# Patient Record
Sex: FEMALE | Race: OTHER | HISPANIC OR LATINO | Employment: FULL TIME | URBAN - METROPOLITAN AREA
[De-identification: names, ages, dates, MRNs, and addresses within clinical notes are randomized per-mention and may not be internally consistent; named-entity substitution may affect disease eponyms.]

---

## 2024-01-17 ENCOUNTER — APPOINTMENT (OUTPATIENT)
Dept: PRIMARY CARE | Facility: CLINIC | Age: 24
End: 2024-01-17
Payer: COMMERCIAL

## 2024-03-04 ENCOUNTER — APPOINTMENT (OUTPATIENT)
Dept: PRIMARY CARE | Facility: CLINIC | Age: 24
End: 2024-03-04
Payer: COMMERCIAL

## 2024-03-06 ENCOUNTER — OFFICE VISIT (OUTPATIENT)
Dept: PRIMARY CARE | Facility: CLINIC | Age: 24
End: 2024-03-06
Payer: COMMERCIAL

## 2024-03-06 VITALS
TEMPERATURE: 97.5 F | RESPIRATION RATE: 18 BRPM | DIASTOLIC BLOOD PRESSURE: 88 MMHG | HEART RATE: 84 BPM | HEIGHT: 62 IN | BODY MASS INDEX: 29.7 KG/M2 | WEIGHT: 161.4 LBS | SYSTOLIC BLOOD PRESSURE: 130 MMHG

## 2024-03-06 DIAGNOSIS — L73.9 FOLLICULITIS: ICD-10-CM

## 2024-03-06 DIAGNOSIS — Z01.419 VISIT FOR PELVIC EXAM: ICD-10-CM

## 2024-03-06 DIAGNOSIS — M54.2 NECK PAIN: ICD-10-CM

## 2024-03-06 DIAGNOSIS — F41.9 ANXIETY: ICD-10-CM

## 2024-03-06 DIAGNOSIS — L70.0 ACNE VULGARIS: ICD-10-CM

## 2024-03-06 DIAGNOSIS — M62.838 MUSCLE SPASMS OF NECK: ICD-10-CM

## 2024-03-06 DIAGNOSIS — F50.81 BINGE EATING DISORDER: ICD-10-CM

## 2024-03-06 DIAGNOSIS — F33.40 RECURRENT MAJOR DEPRESSIVE DISORDER, IN REMISSION (CMS-HCC): ICD-10-CM

## 2024-03-06 DIAGNOSIS — Z00.00 WELLNESS EXAMINATION: Primary | ICD-10-CM

## 2024-03-06 PROBLEM — Z97.5 IUD (INTRAUTERINE DEVICE) IN PLACE: Status: ACTIVE | Noted: 2022-01-03

## 2024-03-06 PROBLEM — Z86.16 HISTORY OF COVID-19: Status: RESOLVED | Noted: 2023-01-03 | Resolved: 2024-03-06

## 2024-03-06 PROCEDURE — 99385 PREV VISIT NEW AGE 18-39: CPT | Performed by: NURSE PRACTITIONER

## 2024-03-06 RX ORDER — AZELAIC ACID 0.15 G/G
1 GEL TOPICAL 2 TIMES DAILY
Qty: 180 G | Refills: 3 | Status: SHIPPED | OUTPATIENT
Start: 2024-03-06 | End: 2025-03-06

## 2024-03-06 RX ORDER — CLINDAMYCIN PHOSPHATE 10 UG/ML
LOTION TOPICAL 2 TIMES DAILY
COMMUNITY
Start: 2024-02-08 | End: 2024-03-06 | Stop reason: SDUPTHER

## 2024-03-06 RX ORDER — PROPRANOLOL HYDROCHLORIDE 10 MG/1
10 TABLET ORAL 2 TIMES DAILY
COMMUNITY
Start: 2023-09-13 | End: 2024-03-06 | Stop reason: SDUPTHER

## 2024-03-06 RX ORDER — PROPRANOLOL HYDROCHLORIDE 10 MG/1
10 TABLET ORAL 2 TIMES DAILY
Qty: 60 TABLET | Refills: 3 | Status: SHIPPED | OUTPATIENT
Start: 2024-03-06 | End: 2025-03-06

## 2024-03-06 RX ORDER — CYCLOBENZAPRINE HCL 5 MG
5 TABLET ORAL NIGHTLY PRN
Qty: 10 TABLET | Refills: 0 | Status: SHIPPED | OUTPATIENT
Start: 2024-03-06 | End: 2024-03-16

## 2024-03-06 RX ORDER — AZELAIC ACID 0.15 G/G
1 GEL TOPICAL 2 TIMES DAILY
COMMUNITY
Start: 2024-02-08 | End: 2024-03-06 | Stop reason: SDUPTHER

## 2024-03-06 RX ORDER — CLINDAMYCIN PHOSPHATE 10 UG/ML
LOTION TOPICAL 2 TIMES DAILY
Qty: 180 ML | Refills: 3 | Status: SHIPPED | OUTPATIENT
Start: 2024-03-06 | End: 2025-03-06

## 2024-03-06 RX ORDER — LAMOTRIGINE 25 MG/1
50 TABLET ORAL NIGHTLY
Qty: 180 TABLET | Refills: 3 | Status: SHIPPED | OUTPATIENT
Start: 2024-03-06 | End: 2025-03-06

## 2024-03-06 RX ORDER — LAMOTRIGINE 25 MG/1
2 TABLET ORAL NIGHTLY
COMMUNITY
Start: 2024-02-08 | End: 2024-03-06 | Stop reason: SDUPTHER

## 2024-03-06 RX ORDER — BUPROPION HYDROCHLORIDE 150 MG/1
150 TABLET ORAL
COMMUNITY
Start: 2024-02-08 | End: 2024-03-06 | Stop reason: SDUPTHER

## 2024-03-06 RX ORDER — BUPROPION HYDROCHLORIDE 150 MG/1
150 TABLET ORAL
Qty: 90 TABLET | Refills: 3 | Status: SHIPPED | OUTPATIENT
Start: 2024-03-06 | End: 2025-03-06

## 2024-03-06 ASSESSMENT — ENCOUNTER SYMPTOMS
DYSPHORIC MOOD: 0
BACK PAIN: 0
PALPITATIONS: 0
FATIGUE: 0
SHORTNESS OF BREATH: 0
NECK PAIN: 1
DIARRHEA: 0
COUGH: 0
CONSTIPATION: 0
ABDOMINAL PAIN: 0
NECK STIFFNESS: 0
FEVER: 0
NERVOUS/ANXIOUS: 1

## 2024-03-06 NOTE — PROGRESS NOTES
"Simone Fish is a 23 y.o. female who presents for Annual Exam.  Cele is concerned about her weight. She does pilates and weight training twice per week. She does aerobic exercise 5 days per week. States her appetite is very large and she will eat 2-3 servings at least half of the nights. Then feels guilty afterwards and sometimes feels sick with nausea afterwards. This has been ongoing since childhood. She had previously been on 1/2 phentermine tablet, which was helpful. She could not take full tablet d/t caused tachycardia. She does not want to go back on phentermine now.  Review of Systems   Constitutional:  Negative for fatigue and fever.   Respiratory:  Negative for cough and shortness of breath.    Cardiovascular:  Negative for chest pain and palpitations.   Gastrointestinal:  Negative for abdominal pain, constipation and diarrhea.   Musculoskeletal:  Positive for neck pain. Negative for back pain, gait problem and neck stiffness.   Psychiatric/Behavioral:  Negative for dysphoric mood. The patient is nervous/anxious.    Objective   Physical Exam  Vitals reviewed.   Constitutional:       Appearance: Normal appearance.   HENT:      Head: Normocephalic.   Eyes:      Conjunctiva/sclera: Conjunctivae normal.   Cardiovascular:      Rate and Rhythm: Normal rate and regular rhythm.      Pulses: Normal pulses.   Pulmonary:      Breath sounds: Normal breath sounds.   Abdominal:      General: Bowel sounds are normal.      Palpations: Abdomen is soft.   Musculoskeletal:      Cervical back: Neck supple.   Skin:     General: Skin is warm and dry.   Neurological:      General: No focal deficit present.      Mental Status: She is alert.   Psychiatric:         Mood and Affect: Mood normal.         Thought Content: Thought content normal.     /88   Pulse 84   Temp 36.4 °C (97.5 °F)   Resp 18   Ht 1.575 m (5' 2\")   Wt 73.2 kg (161 lb 6.4 oz)   BMI 29.52 kg/m²   Assessment/Plan   Problem " List Items Addressed This Visit       Neck pain    Relevant Orders    Referral to Physical Therapy     Other Visit Diagnoses       Wellness examination    -  Primary    Visit for pelvic exam        Relevant Orders    Referral to Gynecology    Muscle spasms of neck        Relevant Medications    cyclobenzaprine (Flexeril) 5 mg tablet    Binge eating disorder        Relevant Orders    Referral to Psychology    Anxiety        Relevant Medications    buPROPion XL (Wellbutrin XL) 150 mg 24 hr tablet    propranolol (Inderal) 10 mg tablet    Recurrent major depressive disorder, in remission (CMS/HCC)        Relevant Medications    buPROPion XL (Wellbutrin XL) 150 mg 24 hr tablet    lamoTRIgine (LaMICtal) 25 mg tablet    Acne vulgaris        Relevant Medications    clindamycin (Cleocin T) 1 % lotion    Folliculitis        Relevant Medications    azelaic acid (Finacea) 15 % gel        F/U in 6 months for binge eating disorder, neck muscle pain, anxiety.

## 2024-09-09 ENCOUNTER — APPOINTMENT (OUTPATIENT)
Dept: PRIMARY CARE | Facility: CLINIC | Age: 24
End: 2024-09-09
Payer: COMMERCIAL

## 2024-09-12 ENCOUNTER — APPOINTMENT (OUTPATIENT)
Dept: PRIMARY CARE | Facility: CLINIC | Age: 24
End: 2024-09-12
Payer: COMMERCIAL

## 2024-09-12 VITALS
RESPIRATION RATE: 20 BRPM | TEMPERATURE: 97.3 F | DIASTOLIC BLOOD PRESSURE: 72 MMHG | HEIGHT: 62 IN | HEART RATE: 64 BPM | WEIGHT: 140 LBS | BODY MASS INDEX: 25.76 KG/M2 | SYSTOLIC BLOOD PRESSURE: 100 MMHG

## 2024-09-12 DIAGNOSIS — L73.9 FOLLICULITIS: ICD-10-CM

## 2024-09-12 DIAGNOSIS — F33.40 RECURRENT MAJOR DEPRESSIVE DISORDER, IN REMISSION (CMS-HCC): Primary | ICD-10-CM

## 2024-09-12 DIAGNOSIS — M54.2 NECK PAIN: ICD-10-CM

## 2024-09-12 DIAGNOSIS — F50.81 BINGE EATING DISORDER: ICD-10-CM

## 2024-09-12 DIAGNOSIS — F41.9 ANXIETY: ICD-10-CM

## 2024-09-12 PROBLEM — F50.819 BINGE EATING DISORDER: Status: ACTIVE | Noted: 2024-09-12

## 2024-09-12 LAB
AMPHETAMINES UR QL SCN: ABNORMAL
BARBITURATES UR QL SCN: ABNORMAL
BENZODIAZ UR QL SCN: ABNORMAL
BZE UR QL SCN: ABNORMAL
CANNABINOIDS UR QL SCN: ABNORMAL
FENTANYL+NORFENTANYL UR QL SCN: ABNORMAL
METHADONE UR QL SCN: ABNORMAL
OPIATES UR QL SCN: ABNORMAL
OXYCODONE+OXYMORPHONE UR QL SCN: ABNORMAL
PCP UR QL SCN: ABNORMAL

## 2024-09-12 PROCEDURE — 99214 OFFICE O/P EST MOD 30 MIN: CPT | Performed by: NURSE PRACTITIONER

## 2024-09-12 RX ORDER — LISDEXAMFETAMINE DIMESYLATE 10 MG/1
10 TABLET, CHEWABLE ORAL DAILY
Qty: 30 TABLET | Refills: 0 | Status: SHIPPED | OUTPATIENT
Start: 2024-09-24 | End: 2024-12-23

## 2024-09-12 RX ORDER — BUPROPION HYDROCHLORIDE 150 MG/1
300 TABLET ORAL
Qty: 180 TABLET | Refills: 3 | Status: SHIPPED | OUTPATIENT
Start: 2024-09-12 | End: 2024-09-12

## 2024-09-12 RX ORDER — BUPROPION HYDROCHLORIDE 300 MG/1
300 TABLET ORAL
Qty: 90 TABLET | Refills: 3 | Status: SHIPPED | OUTPATIENT
Start: 2024-09-12 | End: 2025-09-12

## 2024-09-12 RX ORDER — LISDEXAMFETAMINE DIMESYLATE 10 MG/1
10 CAPSULE ORAL DAILY
COMMUNITY
End: 2024-09-12 | Stop reason: SDUPTHER

## 2024-09-12 ASSESSMENT — ENCOUNTER SYMPTOMS
PALPITATIONS: 0
DYSPHORIC MOOD: 0
CONSTIPATION: 0
FATIGUE: 0
DECREASED CONCENTRATION: 0
NERVOUS/ANXIOUS: 1
SLEEP DISTURBANCE: 0
HEADACHES: 0
ABDOMINAL PAIN: 0
FEVER: 0
HYPERACTIVE: 0
APPETITE CHANGE: 1
DIARRHEA: 0
SHORTNESS OF BREATH: 0
COUGH: 0
WEAKNESS: 0

## 2024-09-12 NOTE — PROGRESS NOTES
"Simone Fish is a 24 y.o. female who presents for Follow-up (6m follow up. Pt was ref to psych/therapist last visit and she has done that. She also made GYN apt as advised. She would like a referral to derm for folliculitis. She is on med for this but would like to revisit to see if there is a better med. Pt would also like BW. It was too soon to do at her px in March. ).  HPI  Review of Systems   Constitutional:  Positive for appetite change (desired. d/t treating her binge-eating disorder). Negative for fatigue and fever.   Respiratory:  Negative for cough and shortness of breath.    Cardiovascular:  Negative for chest pain and palpitations.   Gastrointestinal:  Negative for abdominal pain, constipation and diarrhea.   Neurological:  Negative for weakness and headaches.   Psychiatric/Behavioral:  Negative for decreased concentration, dysphoric mood and sleep disturbance. The patient is nervous/anxious. The patient is not hyperactive.      Objective   Physical Exam  Vitals reviewed.   Constitutional:       Appearance: Normal appearance.   Eyes:      Conjunctiva/sclera: Conjunctivae normal.   Cardiovascular:      Rate and Rhythm: Normal rate.   Pulmonary:      Effort: Pulmonary effort is normal.   Neurological:      General: No focal deficit present.      Mental Status: She is alert and oriented to person, place, and time.   Psychiatric:         Mood and Affect: Mood normal.         Thought Content: Thought content normal.     /72   Pulse 64   Temp 36.3 °C (97.3 °F)   Resp 20   Ht 1.575 m (5' 2\")   Wt 63.5 kg (140 lb)   BMI 25.61 kg/m²   Assessment/Plan   Problem List Items Addressed This Visit       Neck pain    Current Assessment & Plan     Doing yoga and pilates. Has not taken any of the previously ordered muscle relaxants. This is adequately controlled.          Anxiety    Relevant Medications    buPROPion XL (Wellbutrin XL) 300 mg 24 hr tablet    Other Relevant Orders    " Follow Up [facility]    Folliculitis    Relevant Orders    Referral to Dermatology    Recurrent major depressive disorder, in remission (CMS-HCC) - Primary    Current Assessment & Plan     Controlled. Will continue current treatment plan.           Relevant Medications    buPROPion XL (Wellbutrin XL) 300 mg 24 hr tablet    Binge eating disorder    Overview     Managed w/ Vyvanse 10 mg and semaglutide         Current Assessment & Plan     She started taking semaglutide through [facility] online in May or June, 2024. She has lost 21 lbs and is no longer binge eating. She plans to continue for about a year since she already paid. She has also improved her diet in general with a Mind/Mediterranean diet. Exercising regularly now, gets about 6k steps per day. I will continue her on Vyvanse for this. Referred her to [facility] since she is not feeling her current therapist is a good fit.          Relevant Medications    lisdexamfetamine (Vyvanse) 10 mg tablet,chewable chewable tablet (Start on 9/24/2024)    Other Relevant Orders    Comprehensive Metabolic Panel    CBC    TSH with reflex to Free T4 if abnormal    Follow Up In [facility]    Drug Screen, Urine With Reflex to Confirmation    Follow Up In [facility] - PCP - Established   F/U in 3 months.

## 2024-09-12 NOTE — ASSESSMENT & PLAN NOTE
Doing yoga and pilates. Has not taken any of the previously ordered muscle relaxants. This is adequately controlled.

## 2024-09-12 NOTE — ASSESSMENT & PLAN NOTE
She started taking semaglutide through [facility] online in May or June, 2024. She has lost 21 lbs and is no longer binge eating. She plans to continue for about a year since she already paid. She has also improved her diet in general with a Mind/Mediterranean diet. Exercising regularly now, gets about 6k steps per day. I will continue her on Vyvanse for this. Referred her to [facility] since she is not feeling her current therapist is a good fit.

## 2024-09-16 LAB
AMPHET UR-MCNC: 1160 NG/ML
MDA UR-MCNC: <200 NG/ML
MDEA UR-MCNC: <200 NG/ML
MDMA UR-MCNC: <200 NG/ML
METHAMPHET UR-MCNC: <200 NG/ML
PHENTERMINE UR CFM-MCNC: <200 NG/ML

## 2024-09-20 ENCOUNTER — TELEPHONE (OUTPATIENT)
Dept: PRIMARY CARE | Facility: CLINIC | Age: 24
End: 2024-09-20
Payer: COMMERCIAL

## 2024-09-24 ENCOUNTER — LAB (OUTPATIENT)
Dept: LAB | Facility: LAB | Age: 24
End: 2024-09-24
Payer: COMMERCIAL

## 2024-09-24 DIAGNOSIS — F50.81 BINGE EATING DISORDER: ICD-10-CM

## 2024-09-24 LAB
ALBUMIN SERPL BCP-MCNC: 4.7 G/DL (ref 3.4–5)
ALP SERPL-CCNC: 52 U/L (ref 33–110)
ALT SERPL W P-5'-P-CCNC: 14 U/L (ref 7–45)
ANION GAP SERPL CALC-SCNC: 11 MMOL/L (ref 10–20)
AST SERPL W P-5'-P-CCNC: 16 U/L (ref 9–39)
BILIRUB SERPL-MCNC: 0.5 MG/DL (ref 0–1.2)
BUN SERPL-MCNC: 8 MG/DL (ref 6–23)
CALCIUM SERPL-MCNC: 9.8 MG/DL (ref 8.6–10.3)
CHLORIDE SERPL-SCNC: 105 MMOL/L (ref 98–107)
CO2 SERPL-SCNC: 26 MMOL/L (ref 21–32)
CREAT SERPL-MCNC: 0.53 MG/DL (ref 0.5–1.05)
EGFRCR SERPLBLD CKD-EPI 2021: >90 ML/MIN/1.73M*2
ERYTHROCYTE [DISTWIDTH] IN BLOOD BY AUTOMATED COUNT: 12.1 % (ref 11.5–14.5)
GLUCOSE SERPL-MCNC: 89 MG/DL (ref 74–99)
HCT VFR BLD AUTO: 44.5 % (ref 36–46)
HGB BLD-MCNC: 14.2 G/DL (ref 12–16)
MCH RBC QN AUTO: 30.9 PG (ref 26–34)
MCHC RBC AUTO-ENTMCNC: 31.9 G/DL (ref 32–36)
MCV RBC AUTO: 97 FL (ref 80–100)
NRBC BLD-RTO: 0 /100 WBCS (ref 0–0)
PLATELET # BLD AUTO: 245 X10*3/UL (ref 150–450)
POTASSIUM SERPL-SCNC: 4.4 MMOL/L (ref 3.5–5.3)
PROT SERPL-MCNC: 7.4 G/DL (ref 6.4–8.2)
RBC # BLD AUTO: 4.59 X10*6/UL (ref 4–5.2)
SODIUM SERPL-SCNC: 138 MMOL/L (ref 136–145)
TSH SERPL-ACNC: 1.03 MIU/L (ref 0.44–3.98)
WBC # BLD AUTO: 5.5 X10*3/UL (ref 4.4–11.3)

## 2024-09-24 PROCEDURE — 84443 ASSAY THYROID STIM HORMONE: CPT

## 2024-09-24 PROCEDURE — 85027 COMPLETE CBC AUTOMATED: CPT

## 2024-09-24 PROCEDURE — 36415 COLL VENOUS BLD VENIPUNCTURE: CPT

## 2024-09-24 PROCEDURE — 80053 COMPREHEN METABOLIC PANEL: CPT

## 2024-09-25 ENCOUNTER — TELEPHONE (OUTPATIENT)
Dept: PRIMARY CARE | Facility: CLINIC | Age: 24
End: 2024-09-25
Payer: COMMERCIAL

## 2024-09-25 NOTE — TELEPHONE ENCOUNTER
----- Message from Jody Coates sent at 9/24/2024  7:47 PM EDT -----  Please notify Zena Fish all labs were essentially normal.

## 2024-10-02 DIAGNOSIS — F50.811 MODERATE BINGE-EATING DISORDER: Primary | ICD-10-CM

## 2024-10-02 RX ORDER — LISDEXAMFETAMINE DIMESYLATE 10 MG/1
10 CAPSULE ORAL DAILY
Qty: 30 CAPSULE | Refills: 0 | Status: SHIPPED | OUTPATIENT
Start: 2024-10-10 | End: 2024-11-09

## 2024-10-08 ENCOUNTER — TELEPHONE (OUTPATIENT)
Dept: PRIMARY CARE | Facility: CLINIC | Age: 24
End: 2024-10-08
Payer: COMMERCIAL

## 2024-11-07 ENCOUNTER — PATIENT MESSAGE (OUTPATIENT)
Dept: PRIMARY CARE | Facility: CLINIC | Age: 24
End: 2024-11-07
Payer: COMMERCIAL

## 2024-11-07 DIAGNOSIS — F50.811 MODERATE BINGE-EATING DISORDER: ICD-10-CM

## 2024-11-07 RX ORDER — LISDEXAMFETAMINE DIMESYLATE 10 MG/1
10 CAPSULE ORAL DAILY
Qty: 30 CAPSULE | Refills: 0 | Status: SHIPPED | OUTPATIENT
Start: 2024-11-07 | End: 2024-12-07

## 2024-11-14 ENCOUNTER — APPOINTMENT (OUTPATIENT)
Dept: OBSTETRICS AND GYNECOLOGY | Facility: CLINIC | Age: 24
End: 2024-11-14
Payer: COMMERCIAL

## 2024-12-12 ENCOUNTER — APPOINTMENT (OUTPATIENT)
Dept: PRIMARY CARE | Facility: CLINIC | Age: 24
End: 2024-12-12
Payer: COMMERCIAL

## 2024-12-12 VITALS
RESPIRATION RATE: 19 BRPM | OXYGEN SATURATION: 98 % | HEIGHT: 62 IN | HEART RATE: 95 BPM | SYSTOLIC BLOOD PRESSURE: 114 MMHG | TEMPERATURE: 97.6 F | BODY MASS INDEX: 25.58 KG/M2 | WEIGHT: 139 LBS | DIASTOLIC BLOOD PRESSURE: 72 MMHG

## 2024-12-12 DIAGNOSIS — F50.811 MODERATE BINGE-EATING DISORDER: ICD-10-CM

## 2024-12-12 DIAGNOSIS — F41.9 ANXIETY: ICD-10-CM

## 2024-12-12 PROCEDURE — 99214 OFFICE O/P EST MOD 30 MIN: CPT | Performed by: NURSE PRACTITIONER

## 2024-12-12 RX ORDER — LISDEXAMFETAMINE DIMESYLATE 10 MG/1
10 CAPSULE ORAL DAILY
Qty: 30 CAPSULE | Refills: 0 | Status: CANCELLED | OUTPATIENT
Start: 2024-12-12 | End: 2025-01-11

## 2024-12-12 RX ORDER — DOXYCYCLINE HYCLATE 50 MG/1
CAPSULE, GELATIN COATED ORAL
COMMUNITY
Start: 2024-10-10

## 2024-12-12 RX ORDER — LISDEXAMFETAMINE DIMESYLATE 10 MG/1
10 CAPSULE ORAL DAILY
Qty: 30 CAPSULE | Refills: 0 | Status: SHIPPED | OUTPATIENT
Start: 2024-12-12 | End: 2025-01-11

## 2024-12-12 RX ORDER — PROPRANOLOL HYDROCHLORIDE 10 MG/1
10 TABLET ORAL 2 TIMES DAILY
Qty: 180 TABLET | Refills: 3 | Status: SHIPPED | OUTPATIENT
Start: 2024-12-12 | End: 2025-12-12

## 2024-12-12 RX ORDER — HYDROCORTISONE 25 MG/G
CREAM TOPICAL
COMMUNITY
Start: 2024-10-10

## 2024-12-12 RX ORDER — ADAPALENE GEL USP, 0.3% 3 MG/G
GEL TOPICAL
COMMUNITY
Start: 2024-10-10

## 2024-12-12 ASSESSMENT — ENCOUNTER SYMPTOMS
SHORTNESS OF BREATH: 0
PALPITATIONS: 0
NERVOUS/ANXIOUS: 0
DYSPHORIC MOOD: 0
DECREASED CONCENTRATION: 0
SLEEP DISTURBANCE: 0
FATIGUE: 0

## 2024-12-12 NOTE — PROGRESS NOTES
Subjective   Zena Fish is a 24 y.o. female who presents for Med Refill (3 month medication follow-up. Patient doing well. ).    HPIOARRS:  No data recorded  I have personally reviewed the OARRS report for Zena Fish. I have considered the risks of abuse, dependence, addiction and diversion    Is the patient prescribed a combination of a benzodiazepine and opioid?  No    Last Urine Drug Screen / ordered today: No  Recent Results (from the past 8760 hours)   Amphetamine Confirm, Urine    Collection Time: 09/12/24 10:39 AM   Result Value Ref Range    Methamphetamine Quant, Ur <200 ng/mL    MDA, Urine <200 ng/mL    MDEA, Urine <200 ng/mL    Phentermine,Urine <200 ng/mL    Amphetamines,Urine 1160 ng/mL    MDMA, Urine <200 ng/mL   Drug Screen, Urine With Reflex to Confirmation    Collection Time: 09/12/24 10:39 AM   Result Value Ref Range    Amphetamine Screen, Urine Presumptive Positive (A) Presumptive Negative    Barbiturate Screen, Urine Presumptive Negative Presumptive Negative    Benzodiazepines Screen, Urine Presumptive Negative Presumptive Negative    Cannabinoid Screen, Urine Presumptive Negative Presumptive Negative    Cocaine Metabolite Screen, Urine Presumptive Negative Presumptive Negative    Fentanyl Screen, Urine Presumptive Negative Presumptive Negative    Opiate Screen, Urine Presumptive Negative Presumptive Negative    Oxycodone Screen, Urine Presumptive Negative Presumptive Negative    PCP Screen, Urine Presumptive Negative Presumptive Negative    Methadone Screen, Urine Presumptive Negative Presumptive Negative     Results are as expected.         Controlled Substance Agreement:  Date of the Last Agreement: 12/12/2024  Reviewed Controlled Substance Agreement including but not limited to the benefits, risks, and alternatives to treatment with a Controlled Substance medication(s).    Stimulants:   What is the patient's goal of therapy? Decreasing her binge eating  "disorder  Is this being achieved with current treatment? yes    Activities of Daily Living:   Is your overall impression that this patient is benefiting (symptom reduction outweighs side effects) from stimulant therapy? Yes     1. Physical Functioning: Better  2. Family Relationship: Better  3. Social Relationship: Better  4. Mood: Better  5. Sleep Patterns: Same  6. Overall Function: Better    Review of Systems   Constitutional:  Negative for fatigue.   Respiratory:  Negative for shortness of breath.    Cardiovascular:  Negative for chest pain and palpitations.   Psychiatric/Behavioral:  Negative for decreased concentration, dysphoric mood and sleep disturbance. The patient is not nervous/anxious.    Objective     Physical Exam  Constitutional:       Appearance: Normal appearance. She is normal weight. She is not ill-appearing.   Cardiovascular:      Heart sounds: No murmur heard.  Pulmonary:      Effort: Pulmonary effort is normal.   Neurological:      Mental Status: She is alert and oriented to person, place, and time.   Psychiatric:         Mood and Affect: Mood normal.         Thought Content: Thought content normal.         Judgment: Judgment normal.     /72   Pulse 95   Temp 36.4 °C (97.6 °F)   Resp 19   Ht 1.575 m (5' 2\")   Wt 63 kg (139 lb)   SpO2 98%   BMI 25.42 kg/m²     Assessment/Plan     Problem List Items Addressed This Visit       Anxiety    Overview     Managed w/ propanolol         Relevant Medications    propranolol (Inderal) 10 mg tablet    Binge eating disorder    Overview     Managed w/ Vyvanse 10 mg and semaglutide         Current Assessment & Plan     Controlled. Will continue current treatment plan.              "

## 2024-12-12 NOTE — TELEPHONE ENCOUNTER
Patient requests prescription below    Last Office Visit: 12/12/2024   Next Office Visit: 3/27/2025   RUDS : 09/12/2024    Requested Prescriptions     Pending Prescriptions Disp Refills    lisdexamfetamine (Vyvanse) 10 MG capsule 30 capsule 0     Sig: Take 1 capsule (10 mg) by mouth once daily.

## 2025-01-09 ENCOUNTER — APPOINTMENT (OUTPATIENT)
Dept: OBSTETRICS AND GYNECOLOGY | Facility: CLINIC | Age: 25
End: 2025-01-09
Payer: COMMERCIAL

## 2025-01-09 VITALS
HEIGHT: 62 IN | SYSTOLIC BLOOD PRESSURE: 114 MMHG | DIASTOLIC BLOOD PRESSURE: 68 MMHG | WEIGHT: 141.6 LBS | BODY MASS INDEX: 26.06 KG/M2

## 2025-01-09 DIAGNOSIS — Z01.419 NORMAL GYNECOLOGIC EXAMINATION: Primary | ICD-10-CM

## 2025-01-09 DIAGNOSIS — Z97.5 IUD (INTRAUTERINE DEVICE) IN PLACE: ICD-10-CM

## 2025-01-09 DIAGNOSIS — L73.2 HIDRADENITIS SUPPURATIVA: ICD-10-CM

## 2025-01-09 DIAGNOSIS — Z12.4 SCREENING FOR CERVICAL CANCER: ICD-10-CM

## 2025-01-09 PROCEDURE — 1036F TOBACCO NON-USER: CPT | Performed by: OBSTETRICS & GYNECOLOGY

## 2025-01-09 PROCEDURE — 3008F BODY MASS INDEX DOCD: CPT | Performed by: OBSTETRICS & GYNECOLOGY

## 2025-01-09 PROCEDURE — 99385 PREV VISIT NEW AGE 18-39: CPT | Performed by: OBSTETRICS & GYNECOLOGY

## 2025-01-09 NOTE — PROGRESS NOTES
"GYNECOLOGY PROGRESS NOTE        CC:    Chief Complaint   Patient presents with    Annual Exam     New patient - annual exam with pap test - no other issues  Last pap 11/2021 neg  Patient has her HPV vaccines  Chaperone rhoda alvarez ma       HPI:  Zena Fish is here for a routine GYN examination.  No GYN c/o, no AUB--regular light periods with Mirena IUD.      Has hidradenitis suppurativa and sees dermatology.  Her symptoms are improved but she is noticing hormonal flares  She is wondering whether she should have her IUD removed and go on an OCP to lower her testosterone levels to help with flares.      Contraception:  Mirena IUD (3/2022)  Pregnancy plans:  no  Gardasil:  yes      Depression screen:  negative  Fall screen:  negative  Domestic violence screen:  negative      ROS:  GI - no blood in BMs  URO - no hematuria  GYN - no AUB or vaginal discharge  DERM - + hidradenitis suppurativa lesions  PSYCH - mood stable        PHYSICAL EXAM:  /68 (BP Location: Left arm, Patient Position: Sitting)   Ht 1.575 m (5' 2\")   Wt 64.2 kg (141 lb 9.6 oz)   LMP 01/02/2025   BMI 25.90 kg/m²   GEN:  A&O, NAD  ABD:  NT/ND, soft, no palpable masses  URO:  normal urethra, no bladder TTP  GYN:  normal vulva and perineum w/o lesions or ulcers, normal vagina without discharge or lesions, normal cervix without lesions or discharge or CMT, uterus NT/NE, adnexa mobile and NT/NE  BREAST:  no masses or TTP, no skin lesions or nipple discharge  DERM:  + hidradenitis suppurativa lesions and scarring in groin creases   PSYCH:  normal affect, non-anxious      IMPRESSION/PLAN:  Problem List Items Addressed This Visit       Normal gynecologic examination - Primary    IUD (intrauterine device) in place    Hidradenitis suppurativa     Other Visit Diagnoses       Screening for cervical cancer        Relevant Orders    THINPREP PAP TEST          Pap \"normal\" in 2021, repeat pap done today, no Hx of HGSIL, next due in 3 years  " (2027).  ASCCP pap smear screening guidelines reviewed with the patient.   STD testing declined by patient.    IUD:  Mirena IUD in place, no issues, expiration now in 8 years (3/2030) discussed with the patient.  Possibly earlier change out discussed if menses returns, per patient preference.    Hidradenitis suppurativa:  Spironolactone recommended over changing out Mirena IUD to OCPs for testosterone-lowering effects for prevention.  Patient agreeable--Rx for spironolactone 100 mg provided, use/AE reviewed.  Sees Derm as well        Rufino Tafoya, DO

## 2025-01-12 ENCOUNTER — PATIENT MESSAGE (OUTPATIENT)
Dept: OBSTETRICS AND GYNECOLOGY | Facility: CLINIC | Age: 25
End: 2025-01-12
Payer: COMMERCIAL

## 2025-01-12 DIAGNOSIS — L73.2 HIDRADENITIS SUPPURATIVA: Primary | ICD-10-CM

## 2025-01-13 RX ORDER — SPIRONOLACTONE 100 MG/1
100 TABLET, FILM COATED ORAL DAILY
Qty: 90 TABLET | Refills: 3 | Status: SHIPPED | OUTPATIENT
Start: 2025-01-13 | End: 2026-01-13

## 2025-01-22 LAB
CYTOLOGY CMNT CVX/VAG CYTO-IMP: NORMAL
LAB AP HPV GENOTYPE QUESTION: YES
LAB AP HPV HR: NORMAL
LABORATORY COMMENT REPORT: NORMAL
LMP START DATE: NORMAL
PATH REPORT.TOTAL CANCER: NORMAL

## 2025-02-12 ENCOUNTER — PATIENT MESSAGE (OUTPATIENT)
Dept: PRIMARY CARE | Facility: CLINIC | Age: 25
End: 2025-02-12
Payer: COMMERCIAL

## 2025-02-12 DIAGNOSIS — F50.811 MODERATE BINGE-EATING DISORDER: ICD-10-CM

## 2025-02-12 DIAGNOSIS — F41.9 ANXIETY: ICD-10-CM

## 2025-02-13 NOTE — PATIENT COMMUNICATION
Patient requests prescription below    Last Office Visit: 12/12/2024   Next Office Visit: 3/27/2025     Requested Prescriptions     Pending Prescriptions Disp Refills    propranolol (Inderal) 10 mg tablet 180 tablet 3     Sig: Take 1 tablet (10 mg) by mouth 2 times a day.    lisdexamfetamine (Vyvanse) 10 MG capsule 30 capsule 0     Sig: Take 1 capsule (10 mg) by mouth once daily.

## 2025-02-15 DIAGNOSIS — F41.9 ANXIETY: ICD-10-CM

## 2025-02-15 DIAGNOSIS — F50.819 BINGE EATING DISORDER, UNSPECIFIED SEVERITY: Primary | ICD-10-CM

## 2025-02-15 RX ORDER — LISDEXAMFETAMINE DIMESYLATE 10 MG/1
10 CAPSULE ORAL DAILY
Qty: 90 CAPSULE | Refills: 0 | Status: CANCELLED | OUTPATIENT
Start: 2025-02-15 | End: 2026-02-15

## 2025-02-15 RX ORDER — LISDEXAMFETAMINE DIMESYLATE 10 MG/1
CAPSULE ORAL
Qty: 90 CAPSULE | Refills: 0 | Status: SHIPPED | OUTPATIENT
Start: 2025-02-15 | End: 2025-05-16

## 2025-02-15 RX ORDER — PROPRANOLOL HYDROCHLORIDE 10 MG/1
10 TABLET ORAL 2 TIMES DAILY
Qty: 180 TABLET | Refills: 3 | Status: CANCELLED | OUTPATIENT
Start: 2025-02-15 | End: 2026-02-15

## 2025-02-15 RX ORDER — PROPRANOLOL HYDROCHLORIDE 10 MG/1
10 TABLET ORAL 2 TIMES DAILY
Qty: 180 TABLET | Refills: 3 | Status: SHIPPED | OUTPATIENT
Start: 2025-02-15 | End: 2026-02-15

## 2025-03-27 ENCOUNTER — APPOINTMENT (OUTPATIENT)
Dept: PRIMARY CARE | Facility: CLINIC | Age: 25
End: 2025-03-27
Payer: COMMERCIAL

## 2025-04-10 ENCOUNTER — APPOINTMENT (OUTPATIENT)
Dept: PRIMARY CARE | Facility: CLINIC | Age: 25
End: 2025-04-10

## 2025-05-08 ENCOUNTER — APPOINTMENT (OUTPATIENT)
Dept: PRIMARY CARE | Facility: CLINIC | Age: 25
End: 2025-05-08

## 2026-01-15 ENCOUNTER — APPOINTMENT (OUTPATIENT)
Dept: OBSTETRICS AND GYNECOLOGY | Facility: CLINIC | Age: 26
End: 2026-01-15
Payer: COMMERCIAL